# Patient Record
Sex: FEMALE | Race: WHITE
[De-identification: names, ages, dates, MRNs, and addresses within clinical notes are randomized per-mention and may not be internally consistent; named-entity substitution may affect disease eponyms.]

---

## 2017-09-18 ENCOUNTER — HOSPITAL ENCOUNTER (OUTPATIENT)
Dept: HOSPITAL 45 - C.PAPS | Age: 37
Discharge: HOME | End: 2017-09-18
Attending: OBSTETRICS & GYNECOLOGY
Payer: COMMERCIAL

## 2017-09-18 ENCOUNTER — HOSPITAL ENCOUNTER (OUTPATIENT)
Dept: HOSPITAL 45 - C.LAB1850 | Age: 37
Discharge: HOME | End: 2017-09-18
Attending: OBSTETRICS & GYNECOLOGY
Payer: COMMERCIAL

## 2017-09-18 DIAGNOSIS — O09.521: Primary | ICD-10-CM

## 2017-09-18 DIAGNOSIS — Z3A.00: ICD-10-CM

## 2017-09-18 DIAGNOSIS — O09.219: Primary | ICD-10-CM

## 2017-09-18 LAB
APPEARANCE UR: CLEAR
BASOPHILS # BLD: 0.02 K/UL (ref 0–0.2)
BASOPHILS NFR BLD: 0.3 %
BILIRUB UR-MCNC: (no result) MG/DL
COLOR UR: YELLOW
COMPLETE: YES
EOSINOPHIL NFR BLD AUTO: 231 K/UL (ref 130–400)
HCT VFR BLD CALC: 37.5 % (ref 37–47)
IG%: 0.3 %
IMM GRANULOCYTES NFR BLD AUTO: 22.8 %
LYMPHOCYTES # BLD: 1.71 K/UL (ref 1.2–3.4)
MANUAL MICROSCOPIC REQUIRED?: NO
MCH RBC QN AUTO: 31.7 PG (ref 25–34)
MCHC RBC AUTO-ENTMCNC: 34.1 G/DL (ref 32–36)
MCV RBC AUTO: 92.8 FL (ref 80–100)
MONOCYTES NFR BLD: 5.2 %
NEUTROPHILS # BLD AUTO: 1.1 %
NEUTROPHILS NFR BLD AUTO: 70.3 %
NITRITE UR QL STRIP: (no result)
PH UR STRIP: 6.5 [PH] (ref 4.5–7.5)
PMV BLD AUTO: 10.5 FL (ref 7.4–10.4)
RBC # BLD AUTO: 4.04 M/UL (ref 4.2–5.4)
REVIEW REQ?: NO
SP GR UR STRIP: 1.02 (ref 1–1.03)
URINE BILL WITH OR WITHOUT MIC: (no result)
UROBILINOGEN UR-MCNC: (no result) MG/DL
WBC # BLD AUTO: 7.5 K/UL (ref 4.8–10.8)

## 2017-09-20 LAB
CHLAMYDIA TRACH RNA***: NOT DETECTED
GC (NEIS GONORRHOEAE)RNA**: NOT DETECTED

## 2017-10-16 ENCOUNTER — HOSPITAL ENCOUNTER (OUTPATIENT)
Dept: HOSPITAL 45 - C.LAB1850 | Age: 37
Discharge: HOME | End: 2017-10-16
Attending: OBSTETRICS & GYNECOLOGY
Payer: COMMERCIAL

## 2017-10-16 DIAGNOSIS — Z3A.00: ICD-10-CM

## 2017-10-16 DIAGNOSIS — O09.522: Primary | ICD-10-CM

## 2017-10-16 LAB — GTGD: 50 GRAMS

## 2017-10-17 LAB
AFP CONCENTRATION: 16.7 NG/ML
AFP MSS INTERPRETATION: (no result)
AFP MULTIPLE OF MEDIAN: 0.55
AFPTS COMMENT: (no result)
AFPTS GESTATIONAL AGE: 15.3 WEEKS
AFPTS INSULIN DEP DIABETIC?: NO
AFPTS MATERNAL WT: 143 LBS
ALPHA-FETOPROTEIN RACE: (no result)
EDD DETERMINED BY: (no result)
HISTORY OF NTD: NO
REPEAT SAMPLE?: NO

## 2017-11-20 ENCOUNTER — HOSPITAL ENCOUNTER (OUTPATIENT)
Dept: HOSPITAL 45 - C.LABSPEC | Age: 37
Discharge: HOME | End: 2017-11-20
Attending: OBSTETRICS & GYNECOLOGY
Payer: COMMERCIAL

## 2017-11-20 DIAGNOSIS — O09.522: Primary | ICD-10-CM

## 2017-11-20 DIAGNOSIS — Z3A.00: ICD-10-CM

## 2017-11-20 LAB
APPEARANCE UR: CLEAR
BILIRUB UR-MCNC: (no result) MG/DL
COLOR UR: YELLOW
MANUAL MICROSCOPIC REQUIRED?: NO
NITRITE UR QL STRIP: (no result)
PH UR STRIP: 5.5 [PH] (ref 4.5–7.5)
REVIEW REQ?: NO
SP GR UR STRIP: 1.01 (ref 1–1.03)
URINE EPITHELIAL CELL AUTO: >30 /LPF (ref 0–5)
UROBILINOGEN UR-MCNC: (no result) MG/DL

## 2018-01-30 ENCOUNTER — HOSPITAL ENCOUNTER (OUTPATIENT)
Dept: HOSPITAL 45 - C.LABSPEC | Age: 38
Discharge: HOME | End: 2018-01-30
Attending: OBSTETRICS & GYNECOLOGY
Payer: COMMERCIAL

## 2018-01-30 DIAGNOSIS — O09.523: Primary | ICD-10-CM

## 2018-02-13 ENCOUNTER — HOSPITAL ENCOUNTER (OUTPATIENT)
Dept: HOSPITAL 45 - C.LAB1850 | Age: 38
Discharge: HOME | End: 2018-02-13
Attending: OBSTETRICS & GYNECOLOGY
Payer: COMMERCIAL

## 2018-02-13 DIAGNOSIS — Z29.13: ICD-10-CM

## 2018-02-13 DIAGNOSIS — O09.523: Primary | ICD-10-CM

## 2018-02-13 LAB
HCT VFR BLD CALC: 33.6 % (ref 37–47)
HGB BLD-MCNC: 11.5 G/DL (ref 12–16)

## 2018-03-13 ENCOUNTER — HOSPITAL ENCOUNTER (OUTPATIENT)
Dept: HOSPITAL 45 - C.LABSPEC | Age: 38
Discharge: HOME | End: 2018-03-13
Attending: OBSTETRICS & GYNECOLOGY
Payer: COMMERCIAL

## 2018-03-13 DIAGNOSIS — O09.523: Primary | ICD-10-CM

## 2018-03-25 ENCOUNTER — HOSPITAL ENCOUNTER (OUTPATIENT)
Dept: HOSPITAL 45 - C.LD | Age: 38
Discharge: HOME | End: 2018-03-25
Attending: OBSTETRICS & GYNECOLOGY
Payer: COMMERCIAL

## 2018-03-25 VITALS
WEIGHT: 155.43 LBS | HEIGHT: 70 IN | BODY MASS INDEX: 22.25 KG/M2 | BODY MASS INDEX: 22.25 KG/M2 | WEIGHT: 155.43 LBS | HEIGHT: 70 IN

## 2018-03-25 DIAGNOSIS — Z3A.38: ICD-10-CM

## 2018-03-25 DIAGNOSIS — O26.893: Primary | ICD-10-CM

## 2018-03-25 DIAGNOSIS — R10.11: ICD-10-CM

## 2018-03-25 DIAGNOSIS — O09.523: ICD-10-CM

## 2018-03-25 DIAGNOSIS — R11.0: ICD-10-CM

## 2018-03-25 DIAGNOSIS — R51: ICD-10-CM

## 2018-03-25 LAB
ALT SERPL-CCNC: 22 U/L (ref 12–78)
AST SERPL-CCNC: 22 U/L (ref 15–37)
BASOPHILS # BLD: 0.01 K/UL (ref 0–0.2)
BASOPHILS NFR BLD: 0.1 %
CREAT SERPL-MCNC: 0.64 MG/DL (ref 0.6–1.2)
EOS ABS #: 0.05 K/UL (ref 0–0.5)
EOSINOPHIL NFR BLD AUTO: 201 K/UL (ref 130–400)
HCT VFR BLD CALC: 30.1 % (ref 37–47)
HGB BLD-MCNC: 10.4 G/DL (ref 12–16)
IG#: 0.05 K/UL (ref 0–0.02)
IMM GRANULOCYTES NFR BLD AUTO: 19 %
INR PPP: 0.9 (ref 0.9–1.1)
LYMPHOCYTES # BLD: 1.84 K/UL (ref 1.2–3.4)
MCH RBC QN AUTO: 31.5 PG (ref 25–34)
MCHC RBC AUTO-ENTMCNC: 34.6 G/DL (ref 32–36)
MCV RBC AUTO: 91.2 FL (ref 80–100)
MONO ABS #: 0.46 K/UL (ref 0.11–0.59)
MONOCYTES NFR BLD: 4.8 %
NEUT ABS #: 7.27 K/UL (ref 1.4–6.5)
NEUTROPHILS # BLD AUTO: 0.5 %
NEUTROPHILS NFR BLD AUTO: 75.1 %
PMV BLD AUTO: 10.3 FL (ref 7.4–10.4)
PTT PATIENT: 26 SECONDS (ref 21–31)
RED CELL DISTRIBUTION WIDTH CV: 13 % (ref 11.5–14.5)
RED CELL DISTRIBUTION WIDTH SD: 43 FL (ref 36.4–46.3)
URATE SERPL-MCNC: 2.8 MG/DL (ref 2.6–7.2)
WBC # BLD AUTO: 9.68 K/UL (ref 4.8–10.8)

## 2018-03-30 LAB
BASOPHILS # BLD: 0.02 K/UL (ref 0–0.2)
BASOPHILS NFR BLD: 0.3 %
EOS ABS #: 0.05 K/UL (ref 0–0.5)
EOSINOPHIL NFR BLD AUTO: 177 K/UL (ref 130–400)
HCT VFR BLD CALC: 30.9 % (ref 37–47)
HGB BLD-MCNC: 10.5 G/DL (ref 12–16)
IG#: 0.04 K/UL (ref 0–0.02)
IMM GRANULOCYTES NFR BLD AUTO: 19.5 %
LYMPHOCYTES # BLD: 1.51 K/UL (ref 1.2–3.4)
MCH RBC QN AUTO: 31 PG (ref 25–34)
MCHC RBC AUTO-ENTMCNC: 34 G/DL (ref 32–36)
MCV RBC AUTO: 91.2 FL (ref 80–100)
MONO ABS #: 0.37 K/UL (ref 0.11–0.59)
MONOCYTES NFR BLD: 4.8 %
NEUT ABS #: 5.76 K/UL (ref 1.4–6.5)
NEUTROPHILS # BLD AUTO: 0.6 %
NEUTROPHILS NFR BLD AUTO: 74.3 %
PMV BLD AUTO: 10.7 FL (ref 7.4–10.4)
RED CELL DISTRIBUTION WIDTH CV: 12.9 % (ref 11.5–14.5)
RED CELL DISTRIBUTION WIDTH SD: 42.6 FL (ref 36.4–46.3)
WBC # BLD AUTO: 7.75 K/UL (ref 4.8–10.8)

## 2018-03-30 NOTE — HISTORY & PHYSICAL EXAMINATION
DATE OF ADMISSION:  2018

 

CHIEF COMPLAINT:  Planned  section.

 

HISTORY OF PRESENT ILLNESS:  A 37-year-old  6, para 3-1-1-4 at 39

weeks estimated gestational age for planned  section on Monday for

history of prior  section x4.  The patient also desires tubal

ligation as she is done childbearing.  She denies any vaginal bleeding,

leaking, contractions.  She reports good fetal movement.

 

PRENATAL COURSE:

1.  Prior  section x4.

2.  Rh negative.

3.  History of HELLP syndrome in prior pregnancy.

4.  History of  delivery.

 

PRENATAL LABORATORIES:  Rh negative, rubella immune, GBS negative.

 

OBSTETRICAL HISTORY:   x4, the first in  at 33 weeks with HELLP

syndrome, one 16-week fetal demise with D&E.

 

GYNECOLOGIC HISTORY:  No abnormal Pap smears, no STDs.

 

PAST MEDICAL HISTORY:  None.

 

PAST SURGICAL HISTORY:   x4, D&E, tonsillectomy, and wisdom teeth

extraction.

 

ALLERGIES:  None.

 

MEDICATIONS:  Prenatal vitamins.

 

SOCIAL HISTORY:  No tobacco, alcohol or street drug use.

 

FAMILY HISTORY:  Noncontributory.

 

REVIEW OF SYSTEMS:  All negative 10 system review except for history of anxiety

and history of acid reflux.

 

PHYSICAL EXAMINATION:

VITAL SIGNS:  Height 5 feet 10 inches, weight 162 pounds, blood pressure

110/70.

HEART:  Regular rate and rhythm.

LUNGS:  Clear to auscultation bilaterally.

ABDOMEN:  Soft, gravid, nontender.  Fundal height 36 cm.  Fetal heart tones

130 beats per minute.

EXTREMITIES:  No edema.

 

LABORATORY DATA:  Urine negative for protein and sugar.

 

ASSESSMENT:

1. A 39+ weeks on the day of her admission.

2. Prior  section x4, desires repeat  section.

3. Desires sterilization.

4. Rh negative.

 

PLAN:  The patient will be admitted on Monday for planned  section. 

She is also desiring of tubal sterilization as she is done childbearing.  She

is aware of risks, alternatives and complications of both procedures and

desires to proceed.  She is aware specifically of bleeding, infection, risk

of anesthesia, injury to surrounding structures, both maternal and  as

well as failure of tubal ligation resulting in pregnancy, ectopic pregnancy

and regret rate.  She desires to proceed and a consent form is signed.  She

will have preadmission testing.  She will be nothing to eat or drink after 
midnight

on  night.  She will arrive for her planned  section on Monday.

 

 

 

DERECK

## 2018-04-02 ENCOUNTER — HOSPITAL ENCOUNTER (INPATIENT)
Dept: HOSPITAL 45 - C.LD | Age: 38
LOS: 2 days | Discharge: HOME | End: 2018-04-04
Attending: OBSTETRICS & GYNECOLOGY | Admitting: OBSTETRICS & GYNECOLOGY
Payer: COMMERCIAL

## 2018-04-02 VITALS
WEIGHT: 157.41 LBS | BODY MASS INDEX: 22.54 KG/M2 | HEIGHT: 70 IN | BODY MASS INDEX: 22.54 KG/M2 | WEIGHT: 157.41 LBS | BODY MASS INDEX: 22.54 KG/M2 | HEIGHT: 70 IN

## 2018-04-02 VITALS — OXYGEN SATURATION: 98 %

## 2018-04-02 VITALS
SYSTOLIC BLOOD PRESSURE: 103 MMHG | HEART RATE: 56 BPM | TEMPERATURE: 97.34 F | DIASTOLIC BLOOD PRESSURE: 65 MMHG | OXYGEN SATURATION: 100 %

## 2018-04-02 VITALS
HEART RATE: 61 BPM | SYSTOLIC BLOOD PRESSURE: 104 MMHG | TEMPERATURE: 98.6 F | OXYGEN SATURATION: 100 % | DIASTOLIC BLOOD PRESSURE: 61 MMHG

## 2018-04-02 VITALS
TEMPERATURE: 97.52 F | SYSTOLIC BLOOD PRESSURE: 106 MMHG | HEART RATE: 54 BPM | DIASTOLIC BLOOD PRESSURE: 67 MMHG | OXYGEN SATURATION: 99 %

## 2018-04-02 VITALS
SYSTOLIC BLOOD PRESSURE: 107 MMHG | DIASTOLIC BLOOD PRESSURE: 62 MMHG | OXYGEN SATURATION: 100 % | HEART RATE: 63 BPM | TEMPERATURE: 98.42 F

## 2018-04-02 VITALS — OXYGEN SATURATION: 100 %

## 2018-04-02 VITALS
SYSTOLIC BLOOD PRESSURE: 111 MMHG | HEART RATE: 60 BPM | DIASTOLIC BLOOD PRESSURE: 67 MMHG | TEMPERATURE: 98.42 F | OXYGEN SATURATION: 99 %

## 2018-04-02 VITALS — OXYGEN SATURATION: 99 %

## 2018-04-02 DIAGNOSIS — O34.219: Primary | ICD-10-CM

## 2018-04-02 DIAGNOSIS — O09.523: ICD-10-CM

## 2018-04-02 DIAGNOSIS — Z3A.39: ICD-10-CM

## 2018-04-02 DIAGNOSIS — Z30.2: ICD-10-CM

## 2018-04-02 LAB
BASOPHILS # BLD: 0.03 K/UL (ref 0–0.2)
BASOPHILS NFR BLD: 0.4 %
EOS ABS #: 0.07 K/UL (ref 0–0.5)
EOSINOPHIL NFR BLD AUTO: 203 K/UL (ref 130–400)
HCT VFR BLD CALC: 32.7 % (ref 37–47)
HGB BLD-MCNC: 10.9 G/DL (ref 12–16)
IG#: 0.05 K/UL (ref 0–0.02)
IMM GRANULOCYTES NFR BLD AUTO: 25 %
LYMPHOCYTES # BLD: 1.68 K/UL (ref 1.2–3.4)
MCH RBC QN AUTO: 30.2 PG (ref 25–34)
MCHC RBC AUTO-ENTMCNC: 33.3 G/DL (ref 32–36)
MCV RBC AUTO: 90.6 FL (ref 80–100)
MONO ABS #: 0.53 K/UL (ref 0.11–0.59)
MONOCYTES NFR BLD: 7.9 %
NEUT ABS #: 4.37 K/UL (ref 1.4–6.5)
NEUTROPHILS # BLD AUTO: 1 %
NEUTROPHILS NFR BLD AUTO: 65 %
PMV BLD AUTO: 10.5 FL (ref 7.4–10.4)
RED CELL DISTRIBUTION WIDTH CV: 13.1 % (ref 11.5–14.5)
RED CELL DISTRIBUTION WIDTH SD: 43.1 FL (ref 36.4–46.3)
WBC # BLD AUTO: 6.73 K/UL (ref 4.8–10.8)

## 2018-04-02 PROCEDURE — 0U570ZZ DESTRUCTION OF BILATERAL FALLOPIAN TUBES, OPEN APPROACH: ICD-10-PCS | Performed by: OBSTETRICS & GYNECOLOGY

## 2018-04-02 RX ADMIN — OXYTOCIN SCH MLS/HR: 10 INJECTION, SOLUTION INTRAMUSCULAR; INTRAVENOUS at 20:51

## 2018-04-02 RX ADMIN — SODIUM CHLORIDE PRN MG: 9 INJECTION INTRAMUSCULAR; INTRAVENOUS; SUBCUTANEOUS at 12:50

## 2018-04-02 RX ADMIN — SODIUM CHLORIDE PRN MG: 9 INJECTION INTRAMUSCULAR; INTRAVENOUS; SUBCUTANEOUS at 20:03

## 2018-04-02 RX ADMIN — OXYTOCIN SCH MLS/HR: 10 INJECTION, SOLUTION INTRAMUSCULAR; INTRAVENOUS at 12:43

## 2018-04-02 NOTE — OPERATIVE REPORT
DATE OF OPERATION:  2018

 

PREOPERATIVE DIAGNOSES:

1.  A 39-week intrauterine pregnancy.

2.  Prior  section x4.

3.  Desires sterilization.

 

POSTOPERATIVE DIAGNOSES:  Same.

 

PROCEDURES:

1.  Repeat low transverse  section.

2.  Modified [place] bilateral tubal ligation.

 

SURGEON:  Dr. Mary Kay Tran.

 

ASSISTANT:  Dr. Luis Elliott.

 

IV FLUIDS:  2500 mL.

 

ESTIMATED BLOOD LOSS:  50 mL.

 

URINE OUTPUT:  200 mL.

 

ANESTHESIA:  Spinal with Duramorph.

 

FINDINGS:  Viable male infant, Apgars 8 and 9.  Normal uterus, tubes and

ovaries bilaterally.

 

INDICATION:  A 37-year-old  6, para 3-1-1-4 at 39+ weeks estimated

gestational age for planned repeat  section with a history of prior

 section x4.  The patient also desires sterilization.  She is aware

of her risks, alternatives and complications and desires to proceed.

 

DESCRIPTION OF PROCEDURE:  The patient was taken to the operating room and

identified.  After adequate spinal anesthesia was placed in the supine

position with a leftward tilt and prepped and draped in the usual sterile

fashion.  The knife was used to create a Pfannenstiel skin incision that was

carried down to underlying layer of fascia.  The fascia was nicked in the

midline and this opening was extended laterally using Castro scissors.  Kocher

clamps were used to elevate the superior edge of the fascia.  During this

dissection, the peritoneal cavity was entered into.  This opening was then 
extended

and the opening was stretched.  The bladder blade was placed.  The

vesicouterine peritoneum was grasped with a Katie clamp and elevated.  It was

opened up sharply using Metzenbaum scissors and extended laterally.  The

bladder flap was created digitally.  The knife was then used to create a

hysterotomy that was then stretched.  The 's hand was placed through

the hysterotomy and the bladder blade was removed.  With fundal pressure, the

fetal cephalic was delivered.  The nose and mouth were bulb suctioned.  The

shoulders and body were delivered with ease.  The infant was vigorous and

crying at birth.  The cord was clamped and cut and the infant was handed off

to the awaiting pediatricians.  Cord blood was obtained.  The placenta was

manually expressed.  The uterus was exteriorized and cleared of all clots and

debris.  The hysterotomy was closed in running locking fashion using 0

Vicryl.  Individual imbricating figure-of-eight sutures of 0 vicryl were placed 
across

the hysterotomy for excellent hemostasis.  The attention was then turned to

the right fallopian tube, which was identified to its fimbriated end and a 
knuckle of tube was

elevated using a Temple clamp.  The 2-0 plain suture was then used to doubly

ligate the fallopian tube and then it was transected.  The stumps were

cauterized.  Attention was then turned to the left fallopian tube, which was

also identified to its fimbriated end and then elevated, doubly ligated and

transected in a similar fashion.  Its stumps were also cauterized.  These

bleeding sites were hemostatic.  The uterus was returned to the abdomen 

after the pelvis was irrigated.  The gutters were cleared of all clots and

debris.  There was a small bleeding vessel at the right tubal ligation site

that was elevated with a hemostat and tied for excellent hemostasis.  The

hysterotomy was reinspected and noted to be hemostatic.  The fascia was then

closed in a running fashion using 0 Vicryl.  The subcutaneous fat was

copiously irrigated and then reapproximated using 2-0 chromic.  The skin was

then closed in a subcuticular fashion using 4-0 Vicryl.  The Steri-Strips

were applied.  A pressure dressing was applied.  The patient was transferred

to the recovery room in stable condition.  All sponge, lap and needle counts

were correct x2.

 

 

I attest to the content of the Intraoperative Record and any orders documented 
therein. Any exceptions are noted below.

 

 

 

DERECK

## 2018-04-02 NOTE — ANESTHESIOLOGY PROGRESS NOTE
Anesthesia Post Op Note


Date & Time


Apr 2, 2018 at 09:51





Notes


Mental Status:  alert / awake / arousable, participated in evaluation


Pt Amnestic to Procedure:  Yes


Nausea / Vomiting:  adequately controlled


Pain:  adequately controlled


Airway Patency, RR, SpO2:  stable & adequate


BP & HR:  stable & adequate


Hydration State:  stable & adequate


Neuraxial Anesthesia:  was administered, sensory block is resolving


Anesthetic Complications:  no major complications apparent

## 2018-04-02 NOTE — [facility] POST OPERATIVE BRIEF NOTE
Immediate Operative Summary


Operative Date


Apr 2, 2018.





Pre-Operative Diagnosis





Intrauterine Pregnancy;Prior Caesarean Section times four;Desires Sterilization





Post-Operative Diagnosis





Same





Procedure(s) Performed





Repeat Caesarean Section; Delivery of a live male child at 0807





Bilateral Tubal Ligation





Surgeon


Dr. Tran





Assistant Surgeon(s)


Dr. Elliott, Dr. Holland





Estimated Blood Loss


650 cc





Findings


Viable male. Normal appearing uterus, fallopian tubes and ovaries.





Specimens





cord blood





placenta-hold





portions of right and left fallopian tubes





Drains


Marie clear urine





Complication(s)


None





Disposition


L&D

## 2018-04-03 VITALS
HEART RATE: 63 BPM | SYSTOLIC BLOOD PRESSURE: 104 MMHG | TEMPERATURE: 98.96 F | OXYGEN SATURATION: 99 % | DIASTOLIC BLOOD PRESSURE: 63 MMHG

## 2018-04-03 VITALS — DIASTOLIC BLOOD PRESSURE: 69 MMHG | SYSTOLIC BLOOD PRESSURE: 108 MMHG | HEART RATE: 68 BPM | TEMPERATURE: 98.42 F

## 2018-04-03 VITALS — TEMPERATURE: 98.24 F | HEART RATE: 64 BPM | DIASTOLIC BLOOD PRESSURE: 65 MMHG | SYSTOLIC BLOOD PRESSURE: 101 MMHG

## 2018-04-03 VITALS — OXYGEN SATURATION: 98 %

## 2018-04-03 VITALS — TEMPERATURE: 98.06 F | SYSTOLIC BLOOD PRESSURE: 106 MMHG | HEART RATE: 60 BPM | DIASTOLIC BLOOD PRESSURE: 66 MMHG

## 2018-04-03 VITALS — OXYGEN SATURATION: 97 %

## 2018-04-03 VITALS — OXYGEN SATURATION: 100 %

## 2018-04-03 LAB
BASOPHILS # BLD: 0.01 K/UL (ref 0–0.2)
BASOPHILS NFR BLD: 0.1 %
EOS ABS #: 0.07 K/UL (ref 0–0.5)
EOSINOPHIL NFR BLD AUTO: 174 K/UL (ref 130–400)
HCT VFR BLD CALC: 31.7 % (ref 37–47)
HGB BLD-MCNC: 10.7 G/DL (ref 12–16)
IG#: 0.02 K/UL (ref 0–0.02)
IMM GRANULOCYTES NFR BLD AUTO: 12.8 %
LYMPHOCYTES # BLD: 1.38 K/UL (ref 1.2–3.4)
MCH RBC QN AUTO: 30.9 PG (ref 25–34)
MCHC RBC AUTO-ENTMCNC: 33.8 G/DL (ref 32–36)
MCV RBC AUTO: 91.6 FL (ref 80–100)
MONO ABS #: 0.51 K/UL (ref 0.11–0.59)
MONOCYTES NFR BLD: 4.7 %
NEUT ABS #: 8.8 K/UL (ref 1.4–6.5)
NEUTROPHILS # BLD AUTO: 0.6 %
NEUTROPHILS NFR BLD AUTO: 81.6 %
PMV BLD AUTO: 10.7 FL (ref 7.4–10.4)
RED CELL DISTRIBUTION WIDTH CV: 13.1 % (ref 11.5–14.5)
RED CELL DISTRIBUTION WIDTH SD: 43.5 FL (ref 36.4–46.3)
WBC # BLD AUTO: 10.79 K/UL (ref 4.8–10.8)

## 2018-04-03 RX ADMIN — Medication PRN MG: at 18:42

## 2018-04-03 RX ADMIN — OXYCODONE HYDROCHLORIDE AND ACETAMINOPHEN PRN TAB: 5; 325 TABLET ORAL at 18:42

## 2018-04-03 RX ADMIN — FERROUS SULFATE TAB EC 325 MG (65 MG FE EQUIVALENT) SCH MG: 325 (65 FE) TABLET DELAYED RESPONSE at 07:58

## 2018-04-03 RX ADMIN — Medication PRN MG: at 12:34

## 2018-04-03 RX ADMIN — OXYCODONE HYDROCHLORIDE AND ACETAMINOPHEN PRN TAB: 5; 325 TABLET ORAL at 12:34

## 2018-04-03 RX ADMIN — OXYCODONE HYDROCHLORIDE AND ACETAMINOPHEN PRN TAB: 5; 325 TABLET ORAL at 20:46

## 2018-04-03 NOTE — OB/GYN PROGRESS NOTE
OB/GYN Progress Note


Date of Service


Apr 3, 2018.





Subjective


conversation w/ patient, physical exam, chart review, lab review


Ambulation:  ambulating normally


Voiding:  no voiding problems (montanez removed )


Passing Gas:  Yes


Diet Tolerance:  Regular Diet


Lochia:  Moderate


Feeding Type:  Breast Feeding


Pain:  well controlled





Review of Systems


Constitutional:  No fever, No chills


Respiratory:  No cough, No wheezing


Cardiac:  No chest pain


Abdomen:  + nausea, + vomiting, No pain


Female :  No dysuria





Objective


Vital Signs











  Date Time  Temp Pulse Resp B/P (MAP) Pulse Ox O2 Delivery O2 Flow Rate FiO2


 


4/3/18 03:10 36.9 68 20 108/69    


 


4/3/18 02:00   18  98   


 


4/3/18 01:00   18  98   


 


4/3/18 00:00   18  100   


 


18 23:15 36.9 63 18 107/62    


 


18 23:15     100 Room Air  


 


18 23:00   18  98   


 


18 22:00   18  100   


 


18 21:00   18  99   


 


18 20:00 36.9 60 20 111/67    


 


18 20:00   20  99   


 


18 19:00   16  98   


 


18 18:00   16  100   


 


18 17:00   20  98   


 


18 16:00   20  100   


 


18 15:50      Room Air  


 


18 15:50 36.4 54 20 106/67 (80) 99 Room Air  


 


18 15:00   20  99   


 


18 14:05   16  100   


 


18 13:20 37.0 61 16 104/61 (75) 100 Room Air  


 


18 13:10   16  99   


 


18 12:20 36.3 56 16 103/65 (78) 100 Room Air  


 


18 12:20     100 Room Air  


 


18 12:20   16  100   











Physical Exam


General Appearance:  WELL-APPEARING, WD/WN, NO APPARENT DISTRESS


Respiratory/Chest:  lungs clear, no respiratory distress


Cardiovascular:  regular rate, rhythm, no murmur


Abdomen:  normal bowel sounds


Fundus:  Firm, Relation to Umbilicus (1 below)


Extremities:  non-tender, normal inspection, no pedal edema





Laboratory Results





Last 24 Hours








Test


  4/3/18


06:15


 


White Blood Count 10.79 K/uL 


 


Red Blood Count 3.46 M/uL 


 


Hemoglobin 10.7 g/dL 


 


Hematocrit 31.7 % 


 


Mean Corpuscular Volume 91.6 fL 


 


Mean Corpuscular Hemoglobin 30.9 pg 


 


Mean Corpuscular Hemoglobin


Concent 33.8 g/dl 


 


 


Platelet Count 174 K/uL 


 


Mean Platelet Volume 10.7 fL 


 


Neutrophils (%) (Auto) 81.6 % 


 


Lymphocytes (%) (Auto) 12.8 % 


 


Monocytes (%) (Auto) 4.7 % 


 


Eosinophils (%) (Auto) 0.6 % 


 


Basophils (%) (Auto) 0.1 % 


 


Neutrophils # (Auto) 8.80 K/uL 


 


Lymphocytes # (Auto) 1.38 K/uL 


 


Monocytes # (Auto) 0.51 K/uL 


 


Eosinophils # (Auto) 0.07 K/uL 


 


Basophils # (Auto) 0.01 K/uL 


 


RDW Standard Deviation 43.5 fL 


 


RDW Coefficient of Variation 13.1 % 


 


Immature Granulocyte % (Auto) 0.2 % 


 


Immature Granulocyte # (Auto) 0.02 K/uL 











Medications





Current Inpatient Medications








 Medications


  (Trade)  Dose


 Ordered  Sig/Binh


 Route  Start Time


 Stop Time Status Last Admin


Dose Admin


 


 Ketorolac


 Tromethamine


  (Toradol Inj)  30 mg  Q6H  PRN


 IV.  4/3/18 01:45


 18 01:44  4/3/18 03:12


30 MG


 


 Oxycodone/


 Acetaminophen


  (Percocet


 5-325mg Tab)  1 tab  Q4H  PRN


 PO  4/3/18 01:45


 18 01:44   


 


 


 Oxycodone/


 Acetaminophen


  (Percocet


 5-325mg Tab)  2 tab  Q4H  PRN


 PO  4/3/18 01:45


 18 01:44   


 


 


 Ibuprofen


  (Motrin Tab)  600 mg  Q4H  PRN


 PO  18 08:45


 18 08:44   


 


 


 Ondansetron HCl


  (Zofran Inj)  4 mg  Q4H  PRN


 IV  4/3/18 01:45


 5/3/18 01:44   


 


 


 Magnesium


 Hydroxide


  (Milk Of


 Magnesia Susp)  30 ml  HS


 PO  4/3/18 22:00


 5/3/18 21:59   


 


 


 Ferrous Sulfate


  (Feosol Tab)  325 mg  DAILY


 PO  4/3/18 08:00


 5/3/18 07:59   


 


 


 Cocaine HCl


  (Supercream


 0.870% Cr)    BID  PRN


 EXT  18 08:45


 18 08:44   


 


 


 Lanolin


  (Lanolin Oint)    PRN  PRN


 EXT  18 08:45


 18 08:44   


 


 


 Hydrocortisone


 Acetate


  (Anusol Hc Supp)  25 mg  BID  PRN


 [place]  18 08:45


 18 08:44   


 


 


 Benzocaine


  (Dermoplast Aero


 Spr)  1 appln  PRN  PRN


 EXT  18 08:45


 18 08:44   


 


 


 Diphenhydramine


 HCl


  (Benadryl Cap)  25 mg  QID  PRN


 PO  4/3/18 01:45


 5/3/18 01:44   


 


 


 Diphenhydramine


 HCl


  (Benadryl Inj)  25 mg  QID  PRN


 IV  4/3/18 01:45


 5/3/18 01:44   


 


 


 Senna


  (Senokot Tab)  17.2 mg  HS


 PO  4/3/18 22:00


 5/3/18 21:59   


 











Assessment and Plan


Post-Op


Day Number:  1


Continue Routine Care:


37 female


. Post op day 1. Patient is B-/GBS-/RI. Reviewed vitals WNL. Hgb is stable. 

No signs or sx of anemia. Patient is doing well this morning. Montanez was removed

, tolerating well, adequate output. Patient did experience n/v upon standing 

yesterday afternoon. Likely related to anesthesia. Will continue to monitor 

today. 





Plan:





1. Post-op day 1; cont pp care--ambulate, support breast feeding, monitor lochia

, control pain. Remove incision dressing.   


GALE Holland PGY 1


Resident Physician Supervision Note:





I was present with Dr. Holland during the history and exam. I discussed the 

case with the resident and agree with the findings and plan as documented in 

the note.  Any exceptions or clarifications are listed here: Doing well. 

Routine care. Hgb noted. Already voiding. Po pain meds and plan ambulation. +

Flatus, eating regular diet. Incision c/d/i with steris and dried blood. 





Documented By:  Mary Kay Tran

## 2018-04-04 VITALS — DIASTOLIC BLOOD PRESSURE: 73 MMHG | TEMPERATURE: 98.06 F | HEART RATE: 63 BPM

## 2018-04-04 VITALS
HEART RATE: 63 BPM | TEMPERATURE: 98.06 F | SYSTOLIC BLOOD PRESSURE: 117 MMHG | DIASTOLIC BLOOD PRESSURE: 73 MMHG | OXYGEN SATURATION: 99 %

## 2018-04-04 LAB
HCT VFR BLD CALC: 30.1 % (ref 37–47)
HGB BLD-MCNC: 9.8 G/DL (ref 12–16)

## 2018-04-04 RX ADMIN — OXYCODONE HYDROCHLORIDE AND ACETAMINOPHEN PRN TAB: 5; 325 TABLET ORAL at 01:02

## 2018-04-04 RX ADMIN — Medication PRN MG: at 12:12

## 2018-04-04 RX ADMIN — FERROUS SULFATE TAB EC 325 MG (65 MG FE EQUIVALENT) SCH MG: 325 (65 FE) TABLET DELAYED RESPONSE at 08:00

## 2018-04-04 RX ADMIN — OXYCODONE HYDROCHLORIDE AND ACETAMINOPHEN PRN TAB: 5; 325 TABLET ORAL at 05:46

## 2018-04-04 RX ADMIN — Medication PRN MG: at 01:02

## 2018-04-04 RX ADMIN — OXYCODONE HYDROCHLORIDE AND ACETAMINOPHEN PRN TAB: 5; 325 TABLET ORAL at 12:12

## 2018-04-04 RX ADMIN — Medication PRN MG: at 05:46

## 2018-04-04 NOTE — DISCHARGE INSTRUCTIONS
Discharge Instructions


Date of Service


2018.





Admission


Reason for Admission:  Multiple Delivery All By  Section, Desires





Discharge


Discharge Diagnosis / Problem:  recovery from csection





Discharge Goals


Goal(s):  Routine recovery after 





Medications


Continue Dispensed Medications:  supercream, dermaplast, tucks, lansinoh





Activity Recommendations


Activity Limitations:  per Instructions/Follow-up section





.





Instructions / Follow-Up


Instructions / Follow-Up





ACTIVITY RECOMMENDATIONS:





* Gradual return to full activity over the next 2-3 weeks.


* No lifting - nothing heavier than baby over the next 2-3 weeks.


* Do not engage in vigorous exercise, sexual activity or sports until cleared by


   your physician.


* Do not drive or operate any motorized equipment until cleared by your 

physician.


* You may shower/bathe daily.








MEDICATIONS:





For discomfort or pain, you may use Acetaminophen (Tylenol), Ibuprofen (Advil),


or Naproxen (Aleve) following the package directions. For constipation you may 


use Colace following the package directions.








BREAST CARE:





If you are not breast feeding:





*  Wear a supportive bra 24 hours a day for one to two weeks.


*  Avoid stimulating your breasts and nipples as much as possible during the 

first 


    few weeks after delivery.


*  When taking a shower, have the warm water hit your back, not breasts.


*  When your breasts feel full, apply ice packs.  Usually three to four times a 

day


    helps ease the discomfort.


*  Take a mild pain medication (Tylenol / Motrin) when you are uncomfortable.





If breast feeding:





*  Use breast milk to lubricate nipples.  Lansinoh cream may be used for sore 

nipples. 


    You do not need to remove cream prior to breast feeding.  If using a 

different brand


   of cream, check the label for directions regarding removal of cream prior to 

nursing.


*  Wear a supportive bra.


*  If having problems with breasts or breast feeding, call a lactation 

consultant 


    or your health care provider.








SPECIAL CARE INSTRUCTIONS:





When you are discharged from the hospital, it is important for you to follow 


the instructions listed below:





*  During the first week at home, you should be able to care for yourself and


    your baby.  In addition, the usual light household activities are 

encouraged.





*  Limit your activities to the way you feel.  Do not try to clean the house or 


    move furniture.  Be sensible.





*  If you actively engage in sports and have done so up until the time of your 


    delivery, you may resume these activities as soon as you feel able.  This 

may


    take up to one month or even longer.  Use good judgment.





*  Continue to take your prenatal vitamins for at least six weeks after the 

birth


    of your baby.





*  Your diet need not be limited unless you were on a special diet before your


    delivery. Breast-feeding mothers need around 2500 calories per day and at 

least 


    64-80 ounces of fluid per day (8 to 10 glasses).





*  You should eat foods from the four major food groups.  Crash diets or fad 


    diets are to be avoided.  Eating lean meats, fresh fruits and vegetables, 

low-fat


    dairy products, high fiber foods and a regular exercise program, will help 

you get


    back to your pre-pregnancy weight without putting your health at risk.





*  Constipation is sometimes a problem after delivery.  Take a mild laxative as 


    needed.  If breast feeding, Milk of Magnesia is acceptable to use. You may 


    use a suppository or Fleets enema.





*  A daily shower or tub bath is suggested.  Wash incision daily with warm 

soapy 


    water and pat dry.  It doesn't need to be covered unless drainage is 

present.





*  A bloody vaginal discharge will usually continue until around four weeks 

postpartum.


    A small amount of bleeding may continue for as long as six weeks.  Vaginal 

discharge 


    changes from the bright red bleeding after delivery to pink then brownish 

and finally 


    yellowish-pink before becoming white and disappearing.





*  Bleeding may increase with activity.  Your first period may come in 4-8 

weeks.  


    If you are breast feeding, your period may be delayed even longer.





*  Ladd (sex) can begin whenever both you and your partner feel 

comfortable


    and do not have any form of genital infection.  It is recommended that you 

wait at


    least six weeks for internal and external healing to occur.  If you have 

questions, 


    please talk to your health care practitioner.  A condom should be used to 

prevent


    infection and pregnancy.





*  Foreplay, gentle intercourse and lubrication is very important the first 

several times


   to prevent pain.  A water-based lubricant such as K-Y jelly or Astroglide 

may be used.





*  If you have RH negative blood and your baby is RH positive, you will receive 

RHOGAM 


    by injection prior to discharge.  The nurse will give you a card to keep 

with you that 


    has the date and place that you received RHOGAM after delivery.





*  During your prenatal care, you had a Rubella screen done to check for the 

presence 


    of rubella antibodies in your blood.  If your test was negative, you will 

receive a 


    Rubella vaccine prior to discharge.  This vaccine may cause a fever, 

soreness at the


    injection site and flu-like symptoms.  If these symptoms persist, notify 

your health 


    care practitioner.  Pregnancy is not advised for one month after a Rubella 

vaccine.





*  Verbalizes understanding of car seat law as reviewed with patient nursing.





*  Car Seat hand-out given and reviewed with patient by nursing.





*  Shaken baby information reviewed with patient by nursing.


 


Call you doctor if:





*  Heavy bleeding (saturating several pads an hour) or passing clots the size 

of your fist.





*  A fever >101 degrees F (38.3 degrees C) on two occasions four hours apart and

/or chills.





*  Unusual pain in the pelvic or vaginal areas.





*  Call the doctor for any increased redness, drainage or swelling around the 

incision and


    any pain unrelieved by prescribed pain medication.





* "Baby Blues" lasting longer than two weeks.





If you have any questions or concerns, call your health care practitioner at 


[phone].








FOLLOW UP VISIT:





*  Please call the office at [phone] to schedule a 6 week postpartum 

examination.  


    It is important you keep this appointment.  It is important for you to make 

arrangements 


    for either yearly or twice yearly check-ups thereafter.





Current Hospital Diet


Patient's current hospital diet: Regular OB Diet





Discharge Diet


Recommended Diet:  Regular Diet, Regular OB Diet





Procedures


Procedures Performed:  


Repeat Caesarean Section; Delivery of a live male child at 0807





Bilateral Tubal Ligation





Pending Studies


Studies pending at discharge:  no





Medical Emergencies








.


Who to Call and When:





Medical Emergencies:  If at any time you feel your situation is an emergency, 

please call [phone] immediately.





.





Non-Emergent Contact


Non-Emergency issues call your:  Primary Care Provider, Gynecologist





.


.








"Provider Documentation" section prepared by Robert Holland.








.

## 2018-04-04 NOTE — PROGRESS NOTE
Subjective


2018.


Subjective


conversation w/ patient, physical exam, chart review, lab review


Ambulation:  ambulating normally


Voiding:  no voiding problems (montanez removed )


Passing Gas:  Yes


Diet Tolerance:  Regular Diet


Lochia:  Small


Feeding Type:  Breast Feeding


Pain:  well controlled





Review of Systems


Constitutional:  No fever, No chills


Respiratory:  No cough, No shortness of breath


Cardiac:  No chest pain


Abdomen:  No pain, No nausea, No vomiting


Female :  No dysuria





Objective


Vital Signs











  Date Time  Temp Pulse Resp B/P (MAP) Pulse Ox O2 Delivery O2 Flow Rate FiO2


 


4/3/18 23:30 36.7 60 16 106/66    


 


4/3/18 23:30      Room Air  


 


4/3/18 15:25     99 Room Air  


 


4/3/18 15:25 37.2 63 20 104/63    


 


4/3/18 08:24     97 Room Air  


 


4/3/18 07:50     97 Room Air  


 


4/3/18 07:24 36.8 64 18 101/65    











Physical Exam


General Appearance:  WELL-APPEARING, WD/WN, NO APPARENT DISTRESS


Respiratory/Chest:  lungs clear, normal breath sounds


Cardiovascular:  regular rate, rhythm


Abdomen:  normal bowel sounds, non tender, soft


Fundus:  Firm, Relation to Umbilicus (below umbillicus )


Extremities:  non-tender, normal inspection, no pedal edema





Laboratory Results





Last 24 Hours








Test


  18


06:00











Medications





Current Inpatient Medications








 Medications


  (Trade)  Dose


 Ordered  Sig/Binh


 Route  Start Time


 Stop Time Status Last Admin


Dose Admin


 


 Ketorolac


 Tromethamine


  (Toradol Inj)  30 mg  Q6H  PRN


 IV.  4/3/18 01:45


 18 01:44  4/3/18 03:12


30 MG


 


 Oxycodone/


 Acetaminophen


  (Percocet


 5-325mg Tab)  1 tab  Q4H  PRN


 PO  4/3/18 01:45


 18 01:44  4/3/18 20:46


1 TAB


 


 Oxycodone/


 Acetaminophen


  (Percocet


 5-325mg Tab)  2 tab  Q4H  PRN


 PO  4/3/18 01:45


 18 01:44  18 05:46


2 TAB


 


 Ibuprofen


  (Motrin Tab)  600 mg  Q4H  PRN


 PO  18 08:45


 18 08:44  18 05:46


600 MG


 


 Ondansetron HCl


  (Zofran Inj)  4 mg  Q4H  PRN


 IV  4/3/18 01:45


 5/3/18 01:44   


 


 


 Magnesium


 Hydroxide


  (Milk Of


 Magnesia Susp)  30 ml  HS


 PO  4/3/18 22:00


 5/3/18 21:59  4/3/18 22:27


30 ML


 


 Ferrous Sulfate


  (Feosol Tab)  325 mg  DAILY


 PO  4/3/18 08:00


 5/3/18 07:59  4/3/18 07:58


325 MG


 


 Cocaine HCl


  (Supercream


 0.870% Cr)    BID  PRN


 EXT  18 08:45


 18 08:44   


 


 


 Lanolin


  (Lanolin Oint)    PRN  PRN


 EXT  18 08:45


 18 08:44   


 


 


 Hydrocortisone


 Acetate


  (Anusol Hc Supp)  25 mg  BID  PRN


 [place]  18 08:45


 18 08:44   


 


 


 Benzocaine


  (Dermoplast Aero


 Spr)  1 appln  PRN  PRN


 EXT  18 08:45


 18 08:44   


 


 


 Diphenhydramine


 HCl


  (Benadryl Cap)  25 mg  QID  PRN


 PO  4/3/18 01:45


 5/3/18 01:44   


 


 


 Diphenhydramine


 HCl


  (Benadryl Inj)  25 mg  QID  PRN


 IV  4/3/18 01:45


 5/3/18 01:44   


 


 


 Senna


  (Senokot Tab)  17.2 mg  HS


 PO  4/3/18 22:00


 5/3/18 21:59  4/3/18 22:27


17.2 MG











Assessment and Plan


Post-Op


Day#:  2


Continue Routine Care:


37 female


. Post op day 2. Patient is B-/GBS-/RI. Reviewed vitals WNL. Hgb is stable. 

No signs or sx of anemia. Patient is doing well this morning. Patient doing 

well. Discussed discharge instructions with the patient. 





Plan:





1. Post-op day 2; cont pp care--ambulate, support breast feeding, monitor lochia

, control pain. 


2. Discussed discharge instructions with the patient. Benigno Holland PGY 1





Resident Physician Supervision Note:





I interviewed and examined the patient. Discussed with Dr. Skyler and agree 

with findings and plan as documented in the note. Any exceptions or 

clarifications are listed here: POD#2 doing well. Would like to go home today. 

[place] PDMP checked. Discharge instructions reviewed. 





Documented By:  Kaitlyn B Brunner